# Patient Record
Sex: MALE | Race: WHITE | Employment: STUDENT | URBAN - METROPOLITAN AREA
[De-identification: names, ages, dates, MRNs, and addresses within clinical notes are randomized per-mention and may not be internally consistent; named-entity substitution may affect disease eponyms.]

---

## 2020-05-09 ENCOUNTER — APPOINTMENT (OUTPATIENT)
Dept: CT IMAGING | Facility: CLINIC | Age: 20
End: 2020-05-09
Attending: EMERGENCY MEDICINE
Payer: COMMERCIAL

## 2020-05-09 ENCOUNTER — HOSPITAL ENCOUNTER (EMERGENCY)
Facility: CLINIC | Age: 20
Discharge: HOME | End: 2020-05-09
Attending: EMERGENCY MEDICINE | Admitting: EMERGENCY MEDICINE
Payer: COMMERCIAL

## 2020-05-09 VITALS
RESPIRATION RATE: 17 BRPM | DIASTOLIC BLOOD PRESSURE: 71 MMHG | BODY MASS INDEX: 22.9 KG/M2 | HEIGHT: 70 IN | OXYGEN SATURATION: 98 % | SYSTOLIC BLOOD PRESSURE: 119 MMHG | TEMPERATURE: 97.8 F | WEIGHT: 160 LBS | HEART RATE: 82 BPM

## 2020-05-09 DIAGNOSIS — R56.9 SEIZURE (H): ICD-10-CM

## 2020-05-09 LAB
ALBUMIN SERPL-MCNC: 3.8 G/DL (ref 3.4–5)
ALBUMIN UR-MCNC: 10 MG/DL
ALP SERPL-CCNC: 154 U/L (ref 65–260)
ALT SERPL W P-5'-P-CCNC: 17 U/L (ref 0–50)
AMPHETAMINES UR QL SCN: NEGATIVE
ANION GAP SERPL CALCULATED.3IONS-SCNC: 9 MMOL/L (ref 3–14)
APPEARANCE UR: CLEAR
AST SERPL W P-5'-P-CCNC: 16 U/L (ref 0–35)
BARBITURATES UR QL: NEGATIVE
BASOPHILS # BLD AUTO: 0 10E9/L (ref 0–0.2)
BASOPHILS NFR BLD AUTO: 0.1 %
BENZODIAZ UR QL: NEGATIVE
BILIRUB SERPL-MCNC: 0.4 MG/DL (ref 0.2–1.3)
BILIRUB UR QL STRIP: NEGATIVE
BUN SERPL-MCNC: 16 MG/DL (ref 7–30)
CALCIUM SERPL-MCNC: 9.1 MG/DL (ref 8.5–10.1)
CANNABINOIDS UR QL SCN: NEGATIVE
CHLORIDE SERPL-SCNC: 102 MMOL/L (ref 98–110)
CO2 SERPL-SCNC: 25 MMOL/L (ref 20–32)
COCAINE UR QL: NEGATIVE
COLOR UR AUTO: YELLOW
CREAT SERPL-MCNC: 0.9 MG/DL (ref 0.5–1)
DIFFERENTIAL METHOD BLD: ABNORMAL
EOSINOPHIL # BLD AUTO: 0.1 10E9/L (ref 0–0.7)
EOSINOPHIL NFR BLD AUTO: 0.9 %
ERYTHROCYTE [DISTWIDTH] IN BLOOD BY AUTOMATED COUNT: 12.2 % (ref 10–15)
GFR SERPL CREATININE-BSD FRML MDRD: >90 ML/MIN/{1.73_M2}
GLUCOSE SERPL-MCNC: 217 MG/DL (ref 70–99)
GLUCOSE UR STRIP-MCNC: 30 MG/DL
HCT VFR BLD AUTO: 44.9 % (ref 40–53)
HGB BLD-MCNC: 15.5 G/DL (ref 13.3–17.7)
HGB UR QL STRIP: NEGATIVE
HYALINE CASTS #/AREA URNS LPF: 3 /LPF (ref 0–2)
IMM GRANULOCYTES # BLD: 0 10E9/L (ref 0–0.4)
IMM GRANULOCYTES NFR BLD: 0.2 %
INTERPRETATION ECG - MUSE: NORMAL
KETONES UR STRIP-MCNC: 10 MG/DL
LEUKOCYTE ESTERASE UR QL STRIP: NEGATIVE
LYMPHOCYTES # BLD AUTO: 1.9 10E9/L (ref 0.8–5.3)
LYMPHOCYTES NFR BLD AUTO: 15 %
MCH RBC QN AUTO: 30.1 PG (ref 26.5–33)
MCHC RBC AUTO-ENTMCNC: 34.5 G/DL (ref 31.5–36.5)
MCV RBC AUTO: 87 FL (ref 78–100)
MONOCYTES # BLD AUTO: 0.7 10E9/L (ref 0–1.3)
MONOCYTES NFR BLD AUTO: 5.8 %
MUCOUS THREADS #/AREA URNS LPF: PRESENT /LPF
NEUTROPHILS # BLD AUTO: 9.9 10E9/L (ref 1.6–8.3)
NEUTROPHILS NFR BLD AUTO: 78 %
NITRATE UR QL: NEGATIVE
NRBC # BLD AUTO: 0 10*3/UL
NRBC BLD AUTO-RTO: 0 /100
OPIATES UR QL SCN: NEGATIVE
PCP UR QL SCN: NEGATIVE
PH UR STRIP: 5.5 PH (ref 5–7)
PLATELET # BLD AUTO: 290 10E9/L (ref 150–450)
POTASSIUM SERPL-SCNC: 3.6 MMOL/L (ref 3.4–5.3)
PROCALCITONIN SERPL-MCNC: <0.05 NG/ML
PROT SERPL-MCNC: 7.5 G/DL (ref 6.8–8.8)
RBC # BLD AUTO: 5.15 10E12/L (ref 4.4–5.9)
RBC #/AREA URNS AUTO: <1 /HPF (ref 0–2)
SODIUM SERPL-SCNC: 136 MMOL/L (ref 133–144)
SOURCE: ABNORMAL
SP GR UR STRIP: 1.02 (ref 1–1.03)
UROBILINOGEN UR STRIP-MCNC: NORMAL MG/DL (ref 0–2)
WBC # BLD AUTO: 12.7 10E9/L (ref 4–11)
WBC #/AREA URNS AUTO: 1 /HPF (ref 0–5)

## 2020-05-09 PROCEDURE — 70450 CT HEAD/BRAIN W/O DYE: CPT

## 2020-05-09 PROCEDURE — 99285 EMERGENCY DEPT VISIT HI MDM: CPT | Mod: 25

## 2020-05-09 PROCEDURE — 80053 COMPREHEN METABOLIC PANEL: CPT | Performed by: EMERGENCY MEDICINE

## 2020-05-09 PROCEDURE — 80307 DRUG TEST PRSMV CHEM ANLYZR: CPT | Performed by: EMERGENCY MEDICINE

## 2020-05-09 PROCEDURE — 93005 ELECTROCARDIOGRAM TRACING: CPT

## 2020-05-09 PROCEDURE — 85025 COMPLETE CBC W/AUTO DIFF WBC: CPT | Performed by: EMERGENCY MEDICINE

## 2020-05-09 PROCEDURE — 84145 PROCALCITONIN (PCT): CPT | Performed by: EMERGENCY MEDICINE

## 2020-05-09 PROCEDURE — 81001 URINALYSIS AUTO W/SCOPE: CPT | Mod: XU | Performed by: EMERGENCY MEDICINE

## 2020-05-09 ASSESSMENT — MIFFLIN-ST. JEOR: SCORE: 1747.01

## 2020-05-09 ASSESSMENT — ENCOUNTER SYMPTOMS
SPEECH DIFFICULTY: 1
COUGH: 0
CONFUSION: 1
FEVER: 0
ABDOMINAL PAIN: 0
HEADACHES: 1
VOMITING: 0

## 2020-05-09 NOTE — ED TRIAGE NOTES
Pt brother heard a thud in pt room, went and checked on pt and found him on the floor shaking and not responding. When EMS got there pt was in his bed and was awake. Pt denies seizure hx. Pt per EMS was not making any sense when they first arrived, by since then has cleared.

## 2020-05-09 NOTE — ED NOTES
Bed: ED21  Expected date:   Expected time:   Means of arrival:   Comments:  531 19M first time sz, postictal. Low 02, negative covid

## 2020-05-09 NOTE — ED AVS SNAPSHOT
Emergency Department  [address] [place] [id number]  Phone:  [phone]  Fax:  [phone]                                    Álvaro Salvador   MRN: [id number]    Department:   Emergency Department   Date of Visit:  5/9/2020           After Visit Summary Signature Page    I have received my discharge instructions, and my questions have been answered. I have discussed any challenges I see with this plan with the nurse or doctor.    ..........................................................................................................................................  Patient/Patient Representative Signature      ..........................................................................................................................................  Patient Representative Print Name and Relationship to Patient    ..................................................               ................................................  Date                                   Time    ..........................................................................................................................................  Reviewed by Signature/Title    ...................................................              ..............................................  Date                                               Time          22EPIC Rev 08/18

## 2020-05-09 NOTE — ED PROVIDER NOTES
History     Chief Complaint:    Seizures      The history is provided by the patient.      Álvaro Salvador is a 19 year old male who presents for evaluation after a likely seizure episode. The patient was at home with his brother last night when the brother heard a thud in the patient's room. He came to check on the patient and found him shaking and unresponsive on the floor, at which point he called EMS. Upon EMS arrival, the patient was awake and back in bed. Per EMS, the patient was initially incoherent and his speech was not making any sense but this resolved en route. Here, the patient has a slight headache but is otherwise feeling okay. He denies feeling unwell prior to the episode. He further denies any cough, fever, vomiting, or any personal history of seizures.      Allergies:  Sulfa drugs    Medications:    Penicillin V Potassium  Deltasone    Past Medical History:    Anxiety  Depressive disorder  Panic disorder  Arthralgia  Inverse psoriasis  Concussion    Past Surgical History:    History reviewed. No pertinent surgical history.    Family History:    Father: Asthma    Social History:  The patient was not accompanied to the ED..  Smoking Status: Current every day smoker (vaping device)  Smokeless Tobacco: Never used  Alcohol Use: Yes  Drug Use: Not currently  Marital Status:  Single    Review of Systems   Constitutional: Negative for fever.   Eyes: Negative for visual disturbance.   Respiratory: Negative for cough.    Cardiovascular: Negative for chest pain.   Gastrointestinal: Negative for abdominal pain and vomiting.   Neurological: Positive for syncope, speech difficulty (Immediately following episode) and headaches.   Psychiatric/Behavioral: Positive for confusion (Immediately following episode).   All other systems reviewed and are negative.      Physical Exam     Patient Vitals for the past 24 hrs:   BP Temp Temp src Pulse Heart Rate Resp SpO2 Height Weight   05/09/20 0315 122/67 -- -- 67 71 10  "99 % -- --   05/09/20 0301 130/74 -- -- 94 84 20 100 % -- --   05/09/20 0245 (!) 127/101 -- -- 79 70 16 100 % -- --   05/09/20 0230 127/76 -- -- 82 85 16 100 % -- --   05/09/20 0121 128/82 97.8  F (36.6  C) Oral -- 90 16 98 % 1.778 m (5' 10\") 72.6 kg (160 lb)       Physical Exam  General: Appears well-developed and well-nourished.   Head: No signs of trauma.   Mouth/Throat: Oropharynx is clear and moist.   Eyes: Conjunctivae are normal. Pupils are equal, round, and reactive to light.   Neck: Normal range of motion. No nuchal rigidity. No cervical adenopathy  CV: Normal rate and regular rhythm.    Resp: Effort normal and breath sounds normal. No respiratory distress.   GI: Soft. There is no tenderness.  No rebound or guarding.  Normal bowel sounds.  No CVA tenderness.  MSK: Normal range of motion. no edema. No Calf tenderness.  Neuro: The patient is alert and oriented to person, place, and time.  PERRLA, EOMI, strength in upper/lower extremities normal and symmetrical. Sensation normal. Speech normal.  GCS 15  Skin: Skin is warm and dry. No rash noted.   Psych: normal mood and affect. behavior is normal.       Emergency Department Course     ECG:  Indication: Seizures  Completed at 0118.  Read at 0123.   Normal sinus rhythm  Right axis deviation  Abnormal ECG   Rate 99 bpm. MO interval 142. QRS duration 98. QT/QTc 366/469. P-R-T axes 62 113 53.  Agree with computer interpretation.    Imaging:  Radiology findings were communicated with the patient who voiced understanding of the findings.    CT Head w/o Contrast:  1. No CT finding of a mass, hemorrhage or focal area suggestive of acute infarct  Report per radiology.    Laboratory:  Laboratory findings were communicated with the patient who voiced understanding of the findings.    CBC: WBC 12.7, HGB 15.5,   CMP: Glucose 217, o/w WNL (Creatinine 0.90)  Procalcitonin: <0.05    UA with micro: Glucose 30, Ketones 10, Protein Albumin 10, Mucous present, Hyaline casts " 3 (H) o/w negative    Drug abuse screen 77 urine: Negative     Procedures  None.    Emergency Department Course:  Past medical records, nursing notes, and vitals reviewed.    0124 I performed an exam of the patient as documented above.     EKG obtained in the ED, see results above.     IV was inserted and blood was drawn for laboratory testing, results above.    The patient provided a urine sample here in the emergency department. This was sent for laboratory testing, findings above.    The patient was sent for a head CT while in the emergency department, results above.     0323 I spoke with the patient's parents and brother to obtain further history regarding patient's presentation to ED.    0332 I rechecked the patient and discussed the results of his workup thus far.     Findings and plan explained to the Patient. Patient discharged home with instructions regarding supportive care, medications, and reasons to return. The importance of close follow-up was reviewed.    I personally reviewed the laboratory and imaging results with the Patient and answered all related questions prior to discharge.     Impression & Plan     Medical Decision Making:  Álvaro Salvador is a 19-year-old gentleman who presents with what sounds concerning for possible seizure activity. His brother was walking by and heard a commotion. When he entered the room, the patient seemed to be confused and altered for some time. Upon arrival to the ER, the patient was alert and oriented and had no complaints. He apparently had been feeling well through the day. He has no prior history of seizure. CT scan of the head along with blood work and urine was obtained that was overall reassuring. Patient was monitored for a number of hours and continued to do quite well without complaints. Given this is what sounds to be a first-time seizure, patient is appropriate for discharge home. He was recommended to follow-up with neurology and primary care for  further evaluation. He is also recommended not to drive or put himself in situations that could put himself or others in danger. I also discussed these findings and instructions with the patient's mother with permission of the patient.    Diagnosis:    ICD-10-CM    1. Seizure (H)  R56.9        Disposition:  Discharged to home.      Scribe Disclosure:  I, Bianca Bowers, am serving as a scribe at 1:24 AM on 5/9/2020 to document services personally performed by Anthony Luu MD based on my observations and the provider's statements to me.     Bianca Bowers  5/9/2020    EMERGENCY DEPARTMENT       Anthony Luu MD  05/09/20 0622

## 2020-05-09 NOTE — ED NOTES
Pt ambulated by EDT for a road test.  Pt's gait appears steady.  Pt denies having any lightheadedness or dizziness while walking.  Pt did say he has a slight headache, but otherwise feels normal.  Results reported to MD